# Patient Record
(demographics unavailable — no encounter records)

---

## 2025-02-24 NOTE — ASSESSMENT
[FreeTextEntry1] : 55 yo M with LUTS  - bladder scan with 102ml, last voided 4 hours ago. Unable to give sample today - Discussed possible etiologies for LUTS. Discussed ways to manage them including behavioral modifications such as adequate hydration, controlling constipation, restricting fluids in the evening - Emphasized increased hydration during the day - Discussed pros and cons of PSA screening. Check PSA today - FU in 1 year   Patient is being seen today for evaluation and management of a chronic and longitudinal ongoing condition and I am of the primary treating physician.

## 2025-02-24 NOTE — PHYSICAL EXAM
[Normal Appearance] : normal appearance [Well Groomed] : well groomed [General Appearance - In No Acute Distress] : no acute distress [Edema] : no peripheral edema [Respiration, Rhythm And Depth] : normal respiratory rhythm and effort [Exaggerated Use Of Accessory Muscles For Inspiration] : no accessory muscle use [Abdomen Soft] : soft [Abdomen Tenderness] : non-tender [Costovertebral Angle Tenderness] : no ~M costovertebral angle tenderness [Urethral Meatus] : meatus normal [Penis Abnormality] : normal circumcised penis [Urinary Bladder Findings] : the bladder was normal on palpation [Scrotum] : the scrotum was normal [Epididymis] : the epididymides were normal [Testes Tenderness] : no tenderness of the testes [Testes Mass (___cm)] : there were no testicular masses [Prostate Enlargement] : the prostate was not enlarged [Prostate Tenderness] : the prostate was not tender [No Prostate Nodules] : no prostate nodules [Normal Station and Gait] : the gait and station were normal for the patient's age [] : no rash [No Focal Deficits] : no focal deficits [Oriented To Time, Place, And Person] : oriented to person, place, and time [Affect] : the affect was normal [Mood] : the mood was normal [Chaperone Present] : A chaperone was present in the examining room during all aspects of the physical examination [FreeTextEntry2] : SHASHI Kebede

## 2025-02-24 NOTE — HISTORY OF PRESENT ILLNESS
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  Doesn't really masturbate no painful ejaculation, no hematospermia No ED issues Was given sildenafil by previous urologist for this issue - didn't work and gave him headaches 20 yrs ago chlamydia infection  2/13/23 Interval history: Voiding 2-3/day, nocturia 2-3/night Drinks 3 bottles of water, 4 cups of coffee some urinary hesitancy no gross hematuria stopped smoking about 9 months ago  2/26/24 Interval history: Here for routine checkup Still has nocturia 2-3/night Switched over to green tea at night smoking occasionally in social settings  2/24/25 Interval history: Voiding only twice daily, nocturia 2-3/night stopped smoking since last year Drinks 1 bottle of water, 2 cups of coffee No changes in health